# Patient Record
Sex: MALE | Race: WHITE | URBAN - METROPOLITAN AREA
[De-identification: names, ages, dates, MRNs, and addresses within clinical notes are randomized per-mention and may not be internally consistent; named-entity substitution may affect disease eponyms.]

---

## 2017-04-25 ENCOUNTER — OFFICE VISIT (OUTPATIENT)
Dept: URGENT CARE | Facility: RETAIL CLINIC | Age: 47
End: 2017-04-25

## 2017-04-25 ENCOUNTER — HOSPITAL ENCOUNTER (EMERGENCY)
Facility: CLINIC | Age: 47
Discharge: HOME | End: 2017-04-25
Attending: FAMILY MEDICINE | Admitting: FAMILY MEDICINE

## 2017-04-25 VITALS
BODY MASS INDEX: 31.81 KG/M2 | TEMPERATURE: 97.3 F | DIASTOLIC BLOOD PRESSURE: 99 MMHG | WEIGHT: 240 LBS | HEART RATE: 76 BPM | SYSTOLIC BLOOD PRESSURE: 129 MMHG | OXYGEN SATURATION: 95 % | HEIGHT: 73 IN | RESPIRATION RATE: 20 BRPM

## 2017-04-25 VITALS
DIASTOLIC BLOOD PRESSURE: 89 MMHG | OXYGEN SATURATION: 95 % | HEART RATE: 75 BPM | TEMPERATURE: 97.9 F | SYSTOLIC BLOOD PRESSURE: 138 MMHG

## 2017-04-25 DIAGNOSIS — L08.9 FOREIGN BODY IN FINGER-INFECTED, INITIAL ENCOUNTER: ICD-10-CM

## 2017-04-25 DIAGNOSIS — L08.9 FOREIGN BODY IN FINGER-INFECTED, INITIAL ENCOUNTER: Primary | ICD-10-CM

## 2017-04-25 DIAGNOSIS — S60.452A SUPERFICIAL FOREIGN BODY OF RIGHT MIDDLE FINGER, INITIAL ENCOUNTER: ICD-10-CM

## 2017-04-25 DIAGNOSIS — S60.459A FOREIGN BODY IN FINGER-INFECTED, INITIAL ENCOUNTER: Primary | ICD-10-CM

## 2017-04-25 DIAGNOSIS — L08.9 LOCAL SKIN INFECTION: ICD-10-CM

## 2017-04-25 DIAGNOSIS — S60.459A FOREIGN BODY IN FINGER-INFECTED, INITIAL ENCOUNTER: ICD-10-CM

## 2017-04-25 PROCEDURE — 10060 I&D ABSCESS SIMPLE/SINGLE: CPT | Performed by: FAMILY MEDICINE

## 2017-04-25 PROCEDURE — 99207 ZZC NO BILLABLE SERVICE THIS VISIT: CPT | Performed by: INTERNAL MEDICINE

## 2017-04-25 PROCEDURE — 99283 EMERGENCY DEPT VISIT LOW MDM: CPT | Mod: 25 | Performed by: FAMILY MEDICINE

## 2017-04-25 PROCEDURE — 10060 I&D ABSCESS SIMPLE/SINGLE: CPT | Mod: Z6 | Performed by: FAMILY MEDICINE

## 2017-04-25 RX ORDER — CEPHALEXIN 500 MG/1
500 CAPSULE ORAL 3 TIMES DAILY
Qty: 21 CAPSULE | Refills: 0 | Status: SHIPPED | OUTPATIENT
Start: 2017-04-25 | End: 2017-05-02

## 2017-04-25 NOTE — ED PROVIDER NOTES
"                                                            [facility] ED Provider Note   CC:     Chief Complaint   Patient presents with     Wound Check     HPI:  Anibal Wilkerson is a 46 year old male who presented to the emergency department for evaluation of a wound check. Patient states that he was seen in Urgent Care and was sent here because they didn't have scalpels. Patient got a three-quarter inch wooden sliver punctured into his right third finger on Saturday. He thinks he got most of it out with a needle but then tried soaking his finger in hot water and epsom salt. Has full range of motion. Rates his pain 3/10 at rest and more while being touched. He states that the finger has a more \"throbbing\" pain than actual severe pain.  Patient is accompanied by his family.  Denies fever or any other associated symptoms.       Problem List:  There are no active problems to display for this patient.      MEDS:   Previous Medications    No medications on file       ALLERGIES:  No Known Allergies    Past medical, surgical, and social histories, triage and nursing notes were all reviewed.    Review of Systems   All other systems were reviewed and are negative    Physical Exam     Vitals were reviewed  Patient Vitals for the past 8 hrs:   BP Temp Temp src Pulse Resp SpO2 Height Weight   04/25/17 1807 (!) 140/100 98.4  F (36.9  C) Temporal 76 16 97 % 1.854 m (6' 1\") 108.9 kg (240 lb)     GENERAL APPEARANCE: calm, active, alert man  FACE: normal facies  EYES: Pupils are equal  HENT: normal external exam  NECK: no adenopathy or asymmetry  RESP: normal respiratory effort; clear breath sounds bilaterally  CV: regular rate and rhythm; no significant murmurs, gallops or rubs  ABD: soft, no tenderness; no rebound or guarding; bowel sounds are normal  MS: no gross deformities noted; normal muscle tone.  EXT: No calf tenderness or pitting edema  SKIN: swelling and erythema to the right third finger; there is a fluid " collection noted on ultrasound using high-frequency probe her rate.      Available Lab/Imaging Results   No results found for this or any previous visit (from the past 24 hour(s)).      Procedure: Informed consent was obtained for incision and drainage of suspected small abscess of the right middle finger.  The right middle finger was digitally anesthetized with good anesthetic effect with 4 cc of Marcaine with epinephrine.  Patient had good anesthesia, no wound was cleaned and prepped.  An 11 blade was used to make an incision of about 6-7 millimeters, with pus coming from the wound.  The wound was explored and no foreign bodies were seen.  The wound was irrigated with saline.  The wound was left open, and a dressing applied.        Impression     Final diagnoses:   Foreign body in finger-infected (right middle finger)       ED Course & Medical Decision Making   Anibal Wilkerson is a 46 year old male who presented to the emergency department with infected right middle finger with recent wooden foreign body.  Patient has a small abscess with cellulitis spreading proximally to the MCP region.  The site of the infection is on the palmar side of the right 3rd finger.  An incision and drainage procedure was completed with drainage of a small amount of abscess.  The wound was then explored, and then irrigated and dressed with a bandage.  No obvious foreign bodies were seen.  After care instructions were discussed.  Begin Keflex 500 mg 3 times a day for 7 days for cellulitis.  Recheck in 3 days if not improving.  Return to the ED at any time if worsening.      Written after-visit summary and instructions were given at the time of discharge.      New Prescriptions    CEPHALEXIN (KEFLEX) 500 MG CAPSULE    Take 1 capsule (500 mg) by mouth 3 times daily for 7 days     This document serves as a record of services personally performed by Jace Morton MD. It was created on their behalf by Bruna Shah, a trained medical  scribe. The creation of this record is based on the provider's personal observations and the statements of the patient. This document has been checked and approved by the attending provider.        This note was completed in part using Dragon voice recognition, and may contain word and grammatical errors.        Jace Morton MD  04/25/17 1930

## 2017-04-25 NOTE — ED AVS SNAPSHOT
[facility] Emergency Department    [address] [place] [address]    Phone:  [phone]    Fax:  [phone]                                       Anibal Wilkerson   MRN: [id number]    Department:  [facility] Emergency Department   Date of Visit:  4/25/2017           After Visit Summary Signature Page     I have received my discharge instructions, and my questions have been answered. I have discussed any challenges I see with this plan with the nurse or doctor.    ..........................................................................................................................................  Patient/Patient Representative Signature      ..........................................................................................................................................  Patient Representative Print Name and Relationship to Patient    ..................................................               ................................................  Date                                            Time    ..........................................................................................................................................  Reviewed by Signature/Title    ...................................................              ..............................................  Date                                                            Time

## 2017-04-25 NOTE — ED AVS SNAPSHOT
[facility] Emergency Department    [address] [place] [address]    Phone:  [phone]    Fax:  [phone]                                       Anibal Wilkerson   MRN: [id number]    Department:  [facility] Emergency Department   Date of Visit:  4/25/2017           Patient Information     Date Of Birth          1970        Your diagnoses for this visit were:     Foreign body in finger-infected (right middle finger)        You were seen by Jace Morton MD.      Follow-up Information     Follow up with Your primary clinic provider In 3 days.    Why:  if not improving        Follow up with [facility] Emergency Department.    Specialty:  EMERGENCY MEDICINE    Why:  If symptoms worsen    Contact information:    [address]   [place] [address] [phone]    Additional information:    From [address]: Exit at [email or web address] on [address] side of [place]. Turn right on [email or web address]. Turn left at stoplight on [address]. [facility] will be in view two blocks ahead        Discharge Instructions       Thank you for giving us the opportunity to see you. The impression is that you have an infected right middle finger from a recent wooden splinter foreign body.    We performed an incision and drainage of the infected finger, and there were no obvious foreign bodies.  He did have some pus that was drained and the wound was irrigated.    Keep the wound clean, and wash with soap and water.  Cover with antibiotic ointment and bandage.    Begin Keflex 500 mg 3 times a day for 7 days.    If you are not seeing an improvement within 3 days, please follow up with your primary care provider or clinic.     Return to the Emergency Department at any time if your symptoms worsen.          Discharge References/Attachments     FOREIGN BODY, SOFT TISSUE (NOT REMOVED) (ENGLISH)      24 Hour Appointment Hotline       To make an appointment at any [facility],  "call [phone] ([phone]). If you don't have a family doctor or clinic, we will help you find one. [facility] clinics are conveniently located to serve the needs of you and your family.             Review of your medicines      START taking        Dose / Directions Last dose taken    cephALEXin 500 MG capsule   Commonly known as:  KEFLEX   Dose:  500 mg   Quantity:  21 capsule        Take 1 capsule (500 mg) by mouth 3 times daily for 7 days   Refills:  0                Prescriptions were sent or printed at these locations (1 Prescription)                   [facility] [address] [place] [address] - [address]   [address], [facility] [address]    Telephone:  [phone]   Fax:  [phone]   Hours:                  E-Prescribed (1 of 1)         cephALEXin (KEFLEX) 500 MG capsule                Orders Needing Specimen Collection     None      Pending Results     No orders found from 4/23/2017 to 4/26/2017.            Pending Culture Results     No orders found from 4/23/2017 to 4/26/2017.            Thank you for choosing [facility]       Thank you for choosing [facility] for your care. Our goal is always to provide you with excellent care. Hearing back from our patients is one way we can continue to improve our services. Please take a few minutes to complete the written survey that you may receive in the mail after you visit with us. Thank you!        [facility] Information     [employer] lets you send messages to your doctor, view your test results, renew your prescriptions, schedule appointments and more. To sign up, go to [email or web address] . Click on \"Log in\" on the left side of the screen, which will take you to the Welcome page. Then click on \"Sign up Now\" on the right side of the page.     You will be asked to enter the access code listed below, as well as some personal information. Please follow the directions to create your username and password.     Your access code is: [email or web address]  Expires: 7/24/2017  " 5:43 PM     Your access code will  in 90 days. If you need help or a new code, please call your [facility] or [phone].        Care EveryWhere ID     This is your Care EveryWhere ID. This could be used by other organizations to access your [facility] medical records  [id number]        After Visit Summary       This is your record. Keep this with you and show to your community pharmacist(s) and doctor(s) at your next visit.

## 2017-04-25 NOTE — NURSING NOTE
Chief Complaint   Patient presents with     Derm Problem     right middle finger infected for 2-3 days       Initial /89  Pulse 75  Temp 97.9  F (36.6  C) (Oral)  SpO2 95% There is no height or weight on file to calculate BMI.  Medication Reconciliation: complete   Jenna Velasco

## 2017-04-25 NOTE — ED NOTES
Pt comes in with a wound to his R third finger. Pt had a sliver in this same finger on Saturday. Pt complains of pain in the finger.

## 2017-04-25 NOTE — MR AVS SNAPSHOT
"              After Visit Summary   2017    Anibal Wilkerson    MRN: [id number]           Patient Information     Date Of Birth          1970        Visit Information        Provider Department      2017 5:40 PM Mahad Velez MD [facility]        Today's Diagnoses     Foreign body in finger-infected, initial encounter    -  1       Follow-ups after your visit        Who to contact     You can reach your care team any time of the day by calling [phone].  Notification of test results:  If you have an abnormal lab result, we will notify you by phone as soon as possible.         Additional Information About Your Visit        MyChart Information     [employer] lets you send messages to your doctor, view your test results, renew your prescriptions, schedule appointments and more. To sign up, go to [email or web address] . Click on \"Log in\" on the left side of the screen, which will take you to the Welcome page. Then click on \"Sign up Now\" on the right side of the page.     You will be asked to enter the access code listed below, as well as some personal information. Please follow the directions to create your username and password.     Your access code is: [email or web address]  Expires: 2017  5:43 PM     Your access code will  in 90 days. If you need help or a new code, please call your [place] clinic or [phone].        Care EveryWhere ID     This is your Care EveryWhere ID. This could be used by other organizations to access your [place] medical records  [id number]        Your Vitals Were     Pulse Temperature Pulse Oximetry             75 97.9  F (36.6  C) (Oral) 95%          Blood Pressure from Last 3 Encounters:   17 138/89    Weight from Last 3 Encounters:   No data found for Wt              Today, you had the following     No orders found for display       Primary Care Provider    None Specified       No primary provider on file.        Thank you!     " Thank you for choosing [facility]  for your care. Our goal is always to provide you with excellent care. Hearing back from our patients is one way we can continue to improve our services. Please take a few minutes to complete the written survey that you may receive in the mail after your visit with us. Thank you!             Your Updated Medication List - Protect others around you: Learn how to safely use, store and throw away your medicines at www.disposemymeds.org.      Notice  As of 4/25/2017  5:43 PM    You have not been prescribed any medications.

## 2017-04-25 NOTE — PROGRESS NOTES
A pleasant 46 year old with right hand ( dominant) middle finger swelling and erythema is seen for evaluation. He states that he had a sliver in his finger 4 days ago when he was doing some wood working. He was able to remove some piece of sliver yesterday w some yellowish drainage. On evaluation there is swelling of the proximal and middle  phalanges w good ROM of the (R) middel finger in flexion and extension. No tenosynovitis is noted. However there remains suspicion for some residual foreign body debris . I therefor advised the patient to go to the ER for further evaluation and management plan now. He agrees w this plan.

## 2017-04-26 NOTE — DISCHARGE INSTRUCTIONS
Thank you for giving us the opportunity to see you. The impression is that you have an infected right middle finger from a recent wooden splinter foreign body.    We performed an incision and drainage of the infected finger, and there were no obvious foreign bodies.  He did have some pus that was drained and the wound was irrigated.    Keep the wound clean, and wash with soap and water.  Cover with antibiotic ointment and bandage.    Begin Keflex 500 mg 3 times a day for 7 days.    If you are not seeing an improvement within 3 days, please follow up with your primary care provider or clinic.     Return to the Emergency Department at any time if your symptoms worsen.

## 2019-03-15 ENCOUNTER — OFFICE VISIT (OUTPATIENT)
Dept: URGENT CARE | Facility: RETAIL CLINIC | Age: 49
End: 2019-03-15
Payer: COMMERCIAL

## 2019-03-15 VITALS — HEART RATE: 70 BPM | SYSTOLIC BLOOD PRESSURE: 157 MMHG | TEMPERATURE: 99.2 F | DIASTOLIC BLOOD PRESSURE: 102 MMHG

## 2019-03-15 DIAGNOSIS — H65.91 RIGHT NON-SUPPURATIVE OTITIS MEDIA: Primary | ICD-10-CM

## 2019-03-15 PROCEDURE — 99213 OFFICE O/P EST LOW 20 MIN: CPT | Performed by: INTERNAL MEDICINE

## 2019-03-15 NOTE — PROGRESS NOTES
[facility] Care Progress Note        Mahad Velez MD, MPH  03/15/2019        History:      Anibal Wilkerson is a pleasant 48 year old year old male with a chief complaint of right ear pain w/ w/o drainage since 2 weeks ago.   No fever or chills.   No dyspnea or wheezing.   No smoking history.   No headache or neck pain.  No GI or  symptoms.   No MSK symptoms.         Assessment and Plan:         Acute Right non-suppurative otitis media:    - amoxicillin-clavulanate (AUGMENTIN) 875-125 MG tablet; Take 1 tablet by mouth 2 times daily  Dispense: 20 tablet; Refill: 0    Discussed supportive care with the patient  Advised to increase fluid intake and rest.  Tylenol 650 mg po for pain q 6 hours prn  F/u w PCP in 4-5 days, earlier if symptoms worsen.                   Physical Exam:      BP (!) 157/102   Pulse 70   Temp 99.2  F (37.3  C) (Oral)      Constitutional: Patient is in no distress The patient is pleasant and cooperative.   HEENT: Head:  Head is atraumatic, normocephalic.    Eyes: Pupils are equal, round and reactive to light and accomodation.  Sclera is non-icteric. No conjunctival injection, or exudate noted. Extraocular motion is intact. Visual acuity is intact bilaterally.  Ears:  External acoustic canals are patent and clear.  There is erythema and bulging of the ( R ) tympanic membrane. No swelling or erythema of the mastoid processes is noted.  Nose:  Nasal congestion w/o drainage or mucosal ulceration is noted.  Throat:  Oral mucosa is moist.  No oral lesions are noted. Posterior pharyngeal hyperemia w/o exudate noted.     Neck Supple.  There is no cervical lymphadenopathy.  No nuchal rigidity noted.  There is no meningismus.     Cardiovascular: Heart is regular to rate and rhythm.  No murmur is noted.  Recheck BP = 132/88 ; HR = 74   Lungs: Clear in the anterior and posterior pulmonary fields.   Abdomen: Soft and non-tender.    Back No flank tenderness is noted.   Extremeties No  edema, no calf tenderness.   Neuro: No focal deficit.   Skin No petechiae or purpura is noted.  There is no rash.   Mood Normal              Data:      All new lab and imaging data was reviewed.   No results found for this or any previous visit.